# Patient Record
Sex: FEMALE | Race: BLACK OR AFRICAN AMERICAN | Employment: FULL TIME | URBAN - METROPOLITAN AREA
[De-identification: names, ages, dates, MRNs, and addresses within clinical notes are randomized per-mention and may not be internally consistent; named-entity substitution may affect disease eponyms.]

---

## 2021-07-27 ENCOUNTER — TRANSCRIBE ORDER (OUTPATIENT)
Dept: SCHEDULING | Age: 41
End: 2021-07-27

## 2021-07-27 DIAGNOSIS — Z12.31 SCREENING MAMMOGRAM FOR HIGH-RISK PATIENT: Primary | ICD-10-CM

## 2021-09-10 ENCOUNTER — HOSPITAL ENCOUNTER (OUTPATIENT)
Dept: MAMMOGRAPHY | Age: 41
Discharge: HOME | End: 2021-09-10
Payer: COMMERCIAL

## 2021-09-10 DIAGNOSIS — Z12.31 SCREENING MAMMOGRAM FOR HIGH-RISK PATIENT: ICD-10-CM

## 2021-09-10 PROCEDURE — 77063 BREAST TOMOSYNTHESIS BI: CPT

## 2022-10-17 ENCOUNTER — TRANSCRIBE ORDER (OUTPATIENT)
Dept: SCHEDULING | Age: 42
End: 2022-10-17

## 2022-10-17 DIAGNOSIS — Z12.31 SCREENING MAMMOGRAM FOR HIGH-RISK PATIENT: Primary | ICD-10-CM

## 2022-12-28 ENCOUNTER — HOSPITAL ENCOUNTER (OUTPATIENT)
Dept: MAMMOGRAPHY | Age: 42
Discharge: HOME | End: 2022-12-28
Payer: COMMERCIAL

## 2022-12-28 DIAGNOSIS — Z12.31 SCREENING MAMMOGRAM FOR HIGH-RISK PATIENT: ICD-10-CM

## 2022-12-28 PROCEDURE — 77063 BREAST TOMOSYNTHESIS BI: CPT

## 2024-02-19 ENCOUNTER — TRANSCRIBE ORDERS (OUTPATIENT)
Facility: HOSPITAL | Age: 44
End: 2024-02-19

## 2024-02-19 DIAGNOSIS — Z12.31 VISIT FOR SCREENING MAMMOGRAM: Primary | ICD-10-CM

## 2024-03-08 ENCOUNTER — HOSPITAL ENCOUNTER (OUTPATIENT)
Facility: HOSPITAL | Age: 44
Discharge: HOME | End: 2024-03-08
Payer: COMMERCIAL

## 2024-03-08 DIAGNOSIS — Z12.31 VISIT FOR SCREENING MAMMOGRAM: ICD-10-CM

## 2024-03-08 PROCEDURE — 77063 BREAST TOMOSYNTHESIS BI: CPT

## 2024-06-21 ENCOUNTER — TELEPHONE (OUTPATIENT)
Age: 44
End: 2024-06-21

## 2024-06-21 NOTE — TELEPHONE ENCOUNTER
Called patient regarding referral; no answer; left VM.       Patient called regarding SW[facility] referral. Sent seminar via email

## 2024-06-28 ENCOUNTER — TELEPHONE (OUTPATIENT)
Age: 44
End: 2024-06-28

## 2024-06-28 NOTE — TELEPHONE ENCOUNTER
Contacted pt because we have received their completed new patient paperwork and we are ready to schedule them. The patient did not answer the call so I left them a message.

## 2024-07-09 ENCOUNTER — TELEPHONE (OUTPATIENT)
Age: 44
End: 2024-07-09

## 2024-07-09 ENCOUNTER — OFFICE VISIT (OUTPATIENT)
Age: 44
End: 2024-07-09
Payer: COMMERCIAL

## 2024-07-09 VITALS
OXYGEN SATURATION: 98 % | DIASTOLIC BLOOD PRESSURE: 80 MMHG | SYSTOLIC BLOOD PRESSURE: 140 MMHG | RESPIRATION RATE: 20 BRPM | TEMPERATURE: 97.6 F | HEART RATE: 65 BPM | BODY MASS INDEX: 47.09 KG/M2 | WEIGHT: 293 LBS | HEIGHT: 66 IN

## 2024-07-09 DIAGNOSIS — E11.9 TYPE 2 DIABETES MELLITUS WITHOUT COMPLICATION, WITHOUT LONG-TERM CURRENT USE OF INSULIN (HCC): ICD-10-CM

## 2024-07-09 DIAGNOSIS — E78.00 HYPERCHOLESTEREMIA: ICD-10-CM

## 2024-07-09 DIAGNOSIS — E66.01 MORBID OBESITY (HCC): Primary | ICD-10-CM

## 2024-07-09 DIAGNOSIS — R06.83 SNORES: ICD-10-CM

## 2024-07-09 DIAGNOSIS — K21.9 GASTROESOPHAGEAL REFLUX DISEASE WITHOUT ESOPHAGITIS: ICD-10-CM

## 2024-07-09 PROCEDURE — 99204 OFFICE O/P NEW MOD 45 MIN: CPT | Performed by: SURGERY

## 2024-07-09 RX ORDER — ATORVASTATIN CALCIUM 40 MG/1
40 TABLET, FILM COATED ORAL DAILY
COMMUNITY

## 2024-07-09 RX ORDER — LORATADINE 10 MG/1
10 TABLET, ORALLY DISINTEGRATING ORAL DAILY
COMMUNITY

## 2024-07-09 RX ORDER — FERROUS SULFATE 325(65) MG
325 TABLET ORAL
COMMUNITY

## 2024-07-09 RX ORDER — SEMAGLUTIDE 1.34 MG/ML
INJECTION, SOLUTION SUBCUTANEOUS
COMMUNITY
Start: 2024-06-06

## 2024-07-09 RX ORDER — PYRIDOXINE HCL (VITAMIN B6) 100 MG
500 TABLET ORAL 2 TIMES DAILY
COMMUNITY

## 2024-07-09 ASSESSMENT — PATIENT HEALTH QUESTIONNAIRE - PHQ9
SUM OF ALL RESPONSES TO PHQ QUESTIONS 1-9: 0
SUM OF ALL RESPONSES TO PHQ9 QUESTIONS 1 & 2: 0
SUM OF ALL RESPONSES TO PHQ QUESTIONS 1-9: 0
2. FEELING DOWN, DEPRESSED OR HOPELESS: NOT AT ALL
SUM OF ALL RESPONSES TO PHQ QUESTIONS 1-9: 0
SUM OF ALL RESPONSES TO PHQ QUESTIONS 1-9: 0
1. LITTLE INTEREST OR PLEASURE IN DOING THINGS: NOT AT ALL

## 2024-07-09 NOTE — TELEPHONE ENCOUNTER
Registered patient for 12 month [facility] Pre-Bariat[facility] Counseling with at [facility] [place], [id number]    Letter mailed to patient with address on file.

## 2024-07-09 NOTE — PROGRESS NOTES
Identified patient with two patient identifiers (name and ). Reviewed chart in preparation for visit and have obtained necessary documentation.    Tequila Talley is a 43 y.o. female  Chief Complaint   Patient presents with    Bariatric, Initial Visit     New bariatric patient interested in lap gastric bypass     BP (!) 140/80 (Site: Left Lower Arm, Position: Sitting, Cuff Size: Large Adult)   Pulse 65   Temp 97.6 °F (36.4 °C)   Resp 20   Ht 1.676 m (5' 6\")   Wt (!) 181.2 kg (399 lb 8 oz)   SpO2 98%   BMI 64.48 kg/m²     1. Have you been to the ER, urgent care clinic since your last visit?  Hospitalized since your last visit?new patient    2. Have you seen or consulted any other health care providers outside of the [facility] since your last visit?  Include any pap smears or colon screening. New patient    Patient and provider made aware of elevated BP x2. Patient asymptomatic. Patient reminded to monitor BP, continue to take BP medications if prescribed, and follow up with PCP/Cardiologist.  Patient expressed understanding and agreement.

## 2024-07-10 ENCOUNTER — TELEPHONE (OUTPATIENT)
Age: 44
End: 2024-07-10

## 2024-07-10 ENCOUNTER — CLINICAL DOCUMENTATION (OUTPATIENT)
Age: 44
End: 2024-07-10

## 2024-07-12 ENCOUNTER — TELEPHONE (OUTPATIENT)
Age: 44
End: 2024-07-12

## 2024-07-15 LAB
25(OH)D3+25(OH)D2 SERPL-MCNC: 6.5 NG/ML (ref 30–100)
ALBUMIN SERPL-MCNC: 4 G/DL (ref 3.9–4.9)
ALP SERPL-CCNC: 59 IU/L (ref 44–121)
ALT SERPL-CCNC: 11 IU/L (ref 0–32)
AST SERPL-CCNC: 10 IU/L (ref 0–40)
BASOPHILS # BLD AUTO: 0 X10E3/UL (ref 0–0.2)
BASOPHILS NFR BLD AUTO: 1 %
BILIRUB SERPL-MCNC: 0.3 MG/DL (ref 0–1.2)
BUN SERPL-MCNC: 10 MG/DL (ref 6–24)
BUN/CREAT SERPL: 13 (ref 9–23)
CALCIUM SERPL-MCNC: 9.8 MG/DL (ref 8.7–10.2)
CHLORIDE SERPL-SCNC: 102 MMOL/L (ref 96–106)
CO2 SERPL-SCNC: 24 MMOL/L (ref 20–29)
CREAT SERPL-MCNC: 0.78 MG/DL (ref 0.57–1)
EGFRCR SERPLBLD CKD-EPI 2021: 97 ML/MIN/1.73
EOSINOPHIL # BLD AUTO: 0.1 X10E3/UL (ref 0–0.4)
EOSINOPHIL NFR BLD AUTO: 2 %
ERYTHROCYTE [DISTWIDTH] IN BLOOD BY AUTOMATED COUNT: 15.6 % (ref 11.7–15.4)
FOLATE SERPL-MCNC: 5.4 NG/ML
GLOBULIN SER CALC-MCNC: 3.6 G/DL (ref 1.5–4.5)
GLUCOSE SERPL-MCNC: 78 MG/DL (ref 70–99)
HBA1C MFR BLD: 5.5 % (ref 4.8–5.6)
HCT VFR BLD AUTO: 32.2 % (ref 34–46.6)
HGB BLD-MCNC: 9.7 G/DL (ref 11.1–15.9)
IMM GRANULOCYTES # BLD AUTO: 0 X10E3/UL (ref 0–0.1)
IMM GRANULOCYTES NFR BLD AUTO: 0 %
IRON SATN MFR SERPL: 11 % (ref 15–55)
IRON SERPL-MCNC: 32 UG/DL (ref 27–159)
LYMPHOCYTES # BLD AUTO: 2.2 X10E3/UL (ref 0.7–3.1)
LYMPHOCYTES NFR BLD AUTO: 40 %
MCH RBC QN AUTO: 23.9 PG (ref 26.6–33)
MCHC RBC AUTO-ENTMCNC: 30.1 G/DL (ref 31.5–35.7)
MCV RBC AUTO: 79 FL (ref 79–97)
MONOCYTES # BLD AUTO: 0.4 X10E3/UL (ref 0.1–0.9)
MONOCYTES NFR BLD AUTO: 7 %
NEUTROPHILS # BLD AUTO: 2.9 X10E3/UL (ref 1.4–7)
NEUTROPHILS NFR BLD AUTO: 50 %
PLATELET # BLD AUTO: 451 X10E3/UL (ref 150–450)
POTASSIUM SERPL-SCNC: 4.7 MMOL/L (ref 3.5–5.2)
PROT SERPL-MCNC: 7.6 G/DL (ref 6–8.5)
RBC # BLD AUTO: 4.06 X10E6/UL (ref 3.77–5.28)
SODIUM SERPL-SCNC: 138 MMOL/L (ref 134–144)
TIBC SERPL-MCNC: 291 UG/DL (ref 250–450)
TSH SERPL DL<=0.005 MIU/L-ACNC: 1.74 UIU/ML (ref 0.45–4.5)
UIBC SERPL-MCNC: 259 UG/DL (ref 131–425)
UREA BREATH TEST QL: NEGATIVE
VIT B12 SERPL-MCNC: 399 PG/ML (ref 232–1245)
WBC # BLD AUTO: 5.7 X10E3/UL (ref 3.4–10.8)

## 2024-07-19 ENCOUNTER — OFFICE VISIT (OUTPATIENT)
Age: 44
End: 2024-07-19

## 2024-07-19 DIAGNOSIS — E66.01 MORBID OBESITY (HCC): Primary | ICD-10-CM

## 2024-07-19 NOTE — PROGRESS NOTES
Pre-operative Bariatric Nutrition Evaluation - [facility] ()     Date: 2024   Physician/Surgeon:Elian Keane M.D.   Name: Tequila Talley  :  1980  Age:  43  Gender: Female   Type of Surgery: [x]           Gastric Bypass   []           Sleeve Gastrectomy    ASSESSMENT:    Past Medical History:Type II DM     Medications/Supplements:   Prior to Admission medications    Medication Sig Start Date End Date Taking? Authorizing Provider   atorvastatin (LIPITOR) 40 MG tablet Take 1 tablet by mouth daily    Yolie Malone MD   metFORMIN (GLUCOPHAGE) 500 MG tablet Take 2 tablets by mouth 2 times daily (with meals)    Yolie Malone MD   OZEMPIC, 1 MG/DOSE, 4 MG/3ML SOPN sc injection INJECT 1MG SUBCUTANEOUSLY ONCE A WEEK 24   Yolie Malone MD   loratadine (CLARITIN REDITABS) 10 MG dissolvable tablet Take 1 tablet by mouth daily    Yolie Malone MD   ferrous sulfate (IRON 325) 325 (65 Fe) MG tablet Take 1 tablet by mouth daily (with breakfast)    Yolie Malone MD   Cranberry 500 MG CAPS Take 1 capsule by mouth 2 times daily    Yloie Malone MD       Food Allergies/Intolerances:none    Anthropometrics:    Ht:66\"   Initial Office Wt: 399#    IBW: 130#    %IBW: 306#     BMI:64    Category: obesity III     Reported wt history: Pt presents today for pre-op nutrition evaluation for wt loss surgery. Reports lowest adult BW of 225# and highest adult BW of 465#. Attributes wt gain over the years r/t family h/o obesity, wt gain after having children. Has attempted wt loss through various methods with most successful wt loss of 65#. Has been unable to maintain long term or significant wt loss and is now seeking approval for weight loss surgery. Pt will need to complete 6 months of supervised weight loss for insurance requirements and a 40# wt loss recommended during that time.      Exercise/Physical Activity:walking at work; researching joining a gym next

## 2024-07-30 ASSESSMENT — SLEEP AND FATIGUE QUESTIONNAIRES
DO YOU HAVE PROBLEMS WITH FREQUENT AWAKENINGS AT NIGHT: NO
ARE YOU BOTHERED BY WAKING UP TOO EARLY AND NOT BEING ABLE TO GET BACK TO SLEEP: NO
DO YOU GET TOO LITTLE SLEEP AT NIGHT: NO
HOW LIKELY ARE YOU TO NOD OFF OR FALL ASLEEP WHILE LYING DOWN TO REST IN THE AFTERNOON WHEN CIRCUMSTANCES PERMIT: SLIGHT CHANCE OF DOZING
HOW LIKELY ARE YOU TO NOD OFF OR FALL ASLEEP IN A CAR, WHILE STOPPED FOR A FEW MINUTES IN TRAFFIC: WOULD NEVER DOZE
DO YOU GENERALLY HAVE DIFFICULTY REMEMBERING THINGS BECAUSE YOU ARE SLEEPY OR TIRED: NO
HOW LIKELY ARE YOU TO NOD OFF OR FALL ASLEEP WHILE SITTING QUIETLY AFTER LUNCH WITHOUT ALCOHOL: WOULD NEVER DOZE
HOW LIKELY ARE YOU TO NOD OFF OR FALL ASLEEP WHILE SITTING AND READING: WOULD NEVER DOZE
DO YOU HAVE DIFFICULTY WATCHING A MOVIE OR VIDEO BECAUSE YOU BECOME SLEEPY OR TIRED: NO
DO YOU HAVE DIFFICULTY VISITING YOUR FAMILY OR FRIENDS IN THEIR HOME BECAUSE YOU BECOME SLEEPY OR TIRED: NO
FOSQ SCORE: 20
DO YOU GET TOO LITTLE SLEEP AT NIGHT: NO
DO YOU TAKE NAPS: NO
SELECT ANY OF THE FOLLOWING BEHAVIORS OBSERVED WHILE PATIENT ASLEEP: LOUD SNORING
ESS TOTAL SCORE: 1
AVERAGE NUMBER OF SLEEP HOURS: 8
AVERAGE NUMBER OF SLEEP HOURS: 8
HAS YOUR RELATIONSHIP WITH FAMILY, FRIENDS OR WORK COLLEAGUES BEEN AFFECTED BECAUSE YOU ARE SLEEPY OR TIRED: NO
HOW LIKELY ARE YOU TO NOD OFF OR FALL ASLEEP WHILE SITTING INACTIVE IN A PUBLIC PLACE: WOULD NEVER DOZE
HOW LIKELY ARE YOU TO NOD OFF OR FALL ASLEEP WHILE SITTING INACTIVE IN A PUBLIC PLACE: WOULD NEVER DOZE
HOW LIKELY ARE YOU TO NOD OFF OR FALL ASLEEP WHILE SITTING AND TALKING TO SOMEONE: WOULD NEVER DOZE
HOW LIKELY ARE YOU TO NOD OFF OR FALL ASLEEP WHEN YOU ARE A PASSENGER IN A CAR FOR AN HOUR WITHOUT A BREAK: WOULD NEVER DOZE
SELECT ANY OF THE FOLLOWING BEHAVIORS OBSERVED WHILE YOU ARE ASLEEP: LOUD SNORING
HOW LIKELY ARE YOU TO NOD OFF OR FALL ASLEEP IN A CAR, WHILE STOPPED FOR A FEW MINUTES IN TRAFFIC: WOULD NEVER DOZE
NUMBER OF TIMES YOU WAKE PER NIGHT: 1
HOW LIKELY ARE YOU TO NOD OFF OR FALL ASLEEP WHILE SITTING QUIETLY AFTER LUNCH WITHOUT ALCOHOL: WOULD NEVER DOZE
DO YOU HAVE DIFFICULTY CONCENTRATING ON THE THINGS YOU DO BECAUSE YOU ARE SLEEPY OR TIRED: NO
DO YOU WORK SHIFTS: NO
ARE YOU BOTHERED BY WAKING UP TOO EARLY AND NOT BEING ABLE TO GET BACK TO SLEEP: NO
DO YOU HAVE DIFFICULTY BEING AS ACTIVE AS YOU WANT TO BE IN THE MORNING BECAUSE YOU ARE SLEEPY OR TIRED: NO
DO YOU HAVE DIFFICULTY OPERATING A MOTOR VEHICLE FOR LONG DISTANCES (GREATER THAN 100 MILES) BECAUSE YOU BECOME SLEEPY: NO
WHAT TIME DO YOU USUALLY GO TO BED: 20:00
HOW LIKELY ARE YOU TO NOD OFF OR FALL ASLEEP WHILE SITTING AND TALKING TO SOMEONE: WOULD NEVER DOZE
DO YOU HAVE DIFFICULTY OPERATING A MOTOR VEHICLE FOR SHORT DISTANCES (LESS THAN 100 MILES) BECAUSE YOU BECOME SLEEPY: NO
HOW LIKELY ARE YOU TO NOD OFF OR FALL ASLEEP WHILE WATCHING TV: WOULD NEVER DOZE
HOW LIKELY ARE YOU TO NOD OFF OR FALL ASLEEP WHEN YOU ARE A PASSENGER IN A CAR FOR AN HOUR WITHOUT A BREAK: WOULD NEVER DOZE
HOW LIKELY ARE YOU TO NOD OFF OR FALL ASLEEP WHILE WATCHING TV: WOULD NEVER DOZE
HAS YOUR MOOD BEEN AFFECTED BECAUSE YOU ARE SLEEPY OR TIRED: NO
DO YOU HAVE DIFFICULTY BEING AS ACTIVE AS YOU WANT TO BE IN THE EVENING BECAUSE YOU ARE SLEEPY OR TIRED: NO
HOW LIKELY ARE YOU TO NOD OFF OR FALL ASLEEP WHILE LYING DOWN TO REST IN THE AFTERNOON WHEN CIRCUMSTANCES PERMIT: SLIGHT CHANCE OF DOZING
HOW LIKELY ARE YOU TO NOD OFF OR FALL ASLEEP WHILE SITTING AND READING: WOULD NEVER DOZE

## 2024-07-31 ENCOUNTER — TELEPHONE (OUTPATIENT)
Age: 44
End: 2024-07-31

## 2024-08-02 ENCOUNTER — PROCEDURE VISIT (OUTPATIENT)
Age: 44
End: 2024-08-02

## 2024-08-02 ENCOUNTER — OFFICE VISIT (OUTPATIENT)
Age: 44
End: 2024-08-02
Payer: COMMERCIAL

## 2024-08-02 VITALS
DIASTOLIC BLOOD PRESSURE: 87 MMHG | OXYGEN SATURATION: 98 % | HEIGHT: 66 IN | BODY MASS INDEX: 47.09 KG/M2 | WEIGHT: 293 LBS | HEART RATE: 78 BPM | SYSTOLIC BLOOD PRESSURE: 124 MMHG

## 2024-08-02 DIAGNOSIS — G47.33 OSA (OBSTRUCTIVE SLEEP APNEA): Primary | ICD-10-CM

## 2024-08-02 PROCEDURE — 99203 OFFICE O/P NEW LOW 30 MIN: CPT | Performed by: INTERNAL MEDICINE

## 2024-08-02 NOTE — PROGRESS NOTES
[address], [place] [address]  Tel.  [phone]    Fax. [phone]     [address], [place] [address]  Tel.  [phone]    Fax. [phone] [address] [place] [address]  Tel.  [phone]    Fax. [phone]       Tequila Talley is a 44 y.o. year old female referred by Dr. Elian Valencia  for evaluation of a sleep disorder.       ASSESSMENT/PLAN:     Diagnosis Orders   1. CAIT (obstructive sleep apnea)  PAT - Home Sleep Test      2. BMI 60.0-69.9, adult (HCC)            Patient has a history and examination consistent with the diagnosis of sleep apnea.    Return for follow-up after testing is completed.    * The patient currently has a Low Risk for having sleep apnea.  STOP-BANG score 3.    * Sleep testing was ordered for initial evaluation.      Orders Placed This Encounter   Procedures    PAT - Home Sleep Test     Standing Status:   Future     Standing Expiration Date:   2/2/2025     Order Specific Question:   Location For Sleep Study     Answer:   [facility]       * She was provided information on sleep apnea including corresponding risk factors and the importance of proper treatment.     * Treatment options were reviewed in detail. she would like to proceed with PAP therapy. Patient will be seen in follow-up in 6-8 weeks after PAP setup to gauge treatment response and adherence to therapy.     * The patient was counseled regarding proper sleep hygiene, with emphasis on ensuring sufficient total sleep time; safe driving and the benefits of exercise and weight loss.      * All of her questions were addressed.    2. Recommended a dedicated weight loss program through appropriate diet and exercise regimen as significant weight reduction has been shown to reduce severity of obstructive sleep apnea.     SUBJECTIVE/OBJECTIVE:    Tequila Talley is an 44 y.o. female referred

## 2024-08-02 NOTE — PROGRESS NOTES
S>Tequila Talley is a 44 y.o. female seen today to receive a home sleep testing unit (WatchPAT).    Patient was educated on proper hookup and operation of the WatchPAT HST via detailed instruction sheet .  Instruction forms with after hours contact and documentation were signed.    O>    There were no vitals taken for this visit.      A>  No diagnosis found.      P>  General information regarding operations and maintenance of the device was provided.  Follow-up appointment was made to return the WatchPAT HST. She will be contacted once the results have been reviewed.  She was asked to contact our office for any problems regarding her home sleep test study.

## 2024-08-02 NOTE — PATIENT INSTRUCTIONS
[address]  [place] [address]  Tel.  [phone]  Fax. [phone] [address]  [place] [address]  Tel.  [phone]  Fax. [phone] [address] [place] [address]  Tel.  [phone]  Fax. [phone]     Sleep Apnea: After Your Visit  Your Care Instructions  Sleep apnea occurs when you frequently stop breathing for 10 seconds or longer during sleep. It can be mild to severe, based on the number of times per hour that you stop breathing or have slowed breathing. Blocked or narrowed airways in your nose, mouth, or throat can cause sleep apnea. Your airway can become blocked when your throat muscles and tongue relax during sleep.  Sleep apnea is common, occurring in 1 out of 20 individuals.  Individuals having any of the following characteristics should be evaluated and treated right away due to high risk and detrimental consequences from untreated sleep apnea:  Obesity  Congestive Heart failure  Atrial Fibrillation  Uncontrolled Hypertension  Type II Diabetes  Night-time Arrhythmias  Stroke  Pulmonary Hypertension  High-risk Driving Populations (pilots, truck drivers, etc.)  Patients Considering Weight-loss Surgery    How do you know you have sleep apnea?  You probably have sleep apnea if you answer 'yes' to 3 or more of the following questions:  S - Have you been told that you Snore?   T - Are you often Tired during the day?  O - Has anyone Observed you stop breathing while sleeping?  P- Do you have (or are being treated for) high blood Pressure?    B - Are you obese (Body Mass Index > 35)?  A - Is your Age 50 years old or older?  N - Is your Neck size greater than 16 inches?  G - Are you male Gender?  A sleep physician can prescribe a breathing device that prevents tissues in the throat from blocking your airway. Or your doctor may recommend using a dental device (oral breathing device) to help keep your airway open. In some cases, surgery may

## 2024-08-16 ENCOUNTER — OFFICE VISIT (OUTPATIENT)
Age: 44
End: 2024-08-16

## 2024-08-16 DIAGNOSIS — E66.01 MORBID OBESITY (HCC): Primary | ICD-10-CM

## 2024-08-16 NOTE — PROGRESS NOTES
[facility] at [facility]  Supervised Weight Loss     Date:   2024    Patient's Name: Tequila Talley  : 1980    Insurance:  [facility]             Session: 2 of  6  Surgery: Gastric Bypass   Surgeon:  Elian Keane M.D.     Height: 66\"  Reported Weight:    386      Lbs.   BMI: 62   Pounds Lost since last month: 13#               Pounds Gained since last month: 0    Starting Weight: 399#   Previous Month’s Weight: 399#  Overall Pounds Lost: 13#  Overall Pounds Gained: 0    Other Pertinent Information: Today's appointment was completed over the phone. Today's wt was self-reported. Pt recommended to lose 40# prior to surgery.     Smoking Status:  none  Alcohol Intake: none    I have reviewed with pt the guidelines of the supervised wt loss program.  Pt understands the expectations of some wt loss during the program and that wt gain could delay the process. I have also explained that appointments need to be consecutive and missing an appointment may result in starting over. Pt has received this information in writing.          Changes that patient has made since last month include:  joined a gym, increased water intake (64 oz per day), eliminated carbonated drinks, limiting/avoiding fried foods, drinking a protein shake for breakfast      Eating Habits and Behaviors  General healthy eating guidelines were also discussed. Pts were instructed that their plate should be made up 1/2 plate coming from non-starchy vegetables, 1/4 coming from lean meat, and 1/4 of their plate coming from carbohydrates, including fruits, starches, or milk.  We discussed measuring meals to 1/2 cup total per meal after surgery. Drinking only calorie-free, sugar-free and non-carbonated beverages. We discussed the importance of drinking 64 ounces of fluid per day to prevent dehydration post-operatively.                      Diet recall - [employer] protein shake at breakfast, lunch (provided at work) is

## 2024-08-27 ENCOUNTER — CLINICAL DOCUMENTATION (OUTPATIENT)
Age: 44
End: 2024-08-27

## 2024-08-27 DIAGNOSIS — G47.33 OSA (OBSTRUCTIVE SLEEP APNEA): Primary | ICD-10-CM

## 2024-08-28 ENCOUNTER — TELEPHONE (OUTPATIENT)
Age: 44
End: 2024-08-28

## 2024-08-28 NOTE — PROGRESS NOTES
Tequila Talley is to be contacted by sleep technologists regarding results of WatchPAT Testing which was indicative of an average RDI 17.0, pAHI 3% of 13.5 and pAHI 4% of 8.2 per hour.  SpO2 seun was 87% and SpO2 of < 88% was 0.2 minutes.        An APAP prescription has been written and patient will be contacted by office staff regarding follow-up  in 2-3 months after initiation of therapy.    Encounter Diagnosis   Name Primary?    CAIT (obstructive sleep apnea) Yes       Orders Placed This Encounter   Procedures    DME Order for (Specify) as OP     - DME device ordered - ResMed Device with Heated Humidifer  / .   - Diagnosis: Obstructive Sleep Apnea (G47.33)  - Length of Need: Lifetime    Pressure Settin - 15 cmH2O     Nasal Cushion (Replace) 2 per month.     Nasal Interface Mask 1 every 3 months.    Headgear 1 every 6 months.     Filter(s) Disposable 2 per month.   Filter(s) Non-Disposable 1 every 6 months.      Water Chamber for Humidifier (Replace) 1 every 6 months.   Tubing with heating element 1 every 3 months.    Perform Mask Fitting per patient preference and comfort - replace as above.      Ehsan Alvarado MD, FAASM; NPI: [id number]  Electronically signed. 2024

## 2024-08-29 ENCOUNTER — TELEPHONE (OUTPATIENT)
Age: 44
End: 2024-08-29

## 2024-08-29 NOTE — TELEPHONE ENCOUNTER
Patient called into the office regarding sleep study results. Patient requested to be contact at her work number [phone] to discuss results.

## 2024-08-29 NOTE — TELEPHONE ENCOUNTER
Pt read [facility] message with results.    Please fax DME order & Schedule 1st adherence visit in 31 to 90 days.

## 2024-09-04 NOTE — TELEPHONE ENCOUNTER
Attempted to contact the patient to discuss PAP device order and DME company options x 2. Unable to LVM or reach patient as \"call cannot be completed at this time\".

## 2024-09-06 ENCOUNTER — CLINICAL DOCUMENTATION (OUTPATIENT)
Age: 44
End: 2024-09-06

## 2024-09-06 NOTE — TELEPHONE ENCOUNTER
Attempted to contact the patient to discuss PAP device order and DME company options (2nd Attempt). Unable to reach patient. PAP device order faxed to [facility]. Unable to LVM as \"call cannot be completed at this time\". [employer] message sent informing the patient that the order has been sent. Patient provided with contact information for DME company. Patient instructed to contact [facility] to schedule first adherence appointment. Importance and purpose of first adherence appointment conveyed via [employer].

## 2024-09-06 NOTE — PROGRESS NOTES
Attempted to contact the patient to discuss PAP device order and DME company options (2nd Attempt). Unable to reach patient. PAP device order faxed to [facility]. Unable to LVM as \"call cannot be completed at this time\". [employer] message sent informing the patient that the order has been sent. Patient provided with contact information for DME company. Patient instructed to contact [facility] to schedule first adherence appointment. Importance and purpose of first adherence appointment conveyed via [employer].

## 2024-09-10 ENCOUNTER — TELEPHONE (OUTPATIENT)
Age: 44
End: 2024-09-10

## 2024-09-13 ENCOUNTER — OFFICE VISIT (OUTPATIENT)
Age: 44
End: 2024-09-13

## 2024-09-13 DIAGNOSIS — E66.01 MORBID OBESITY (HCC): Primary | ICD-10-CM

## 2024-10-02 ENCOUNTER — TELEPHONE (OUTPATIENT)
Age: 44
End: 2024-10-02

## 2024-10-02 NOTE — TELEPHONE ENCOUNTER
Attempted to call patient regarding [facility] insurance requirements, LVM to return call.     -UGI 10/16/24  -FINISH PSYCH 8/2/24  - FINISH NUTRITION (DECEMBER)

## 2024-10-03 NOTE — TELEPHONE ENCOUNTER
Patient returning call, Going for second psych eval 10/10/24.     Confirms [facility] appt    Per insurance, she states can take up until July of 2025

## 2024-10-03 NOTE — TELEPHONE ENCOUNTER
2nd Attempt to call patient regarding [facility] insurance requirements, LVM to return call.      -UGI 10/16/24  -FINISH PSYCH 8/2/24  - FINISH NUTRITION (DECEMBER)

## 2024-10-03 NOTE — TELEPHONE ENCOUNTER
Patient called back and is requesting call back to speak with MA about the insurance requirements at [phone].

## 2024-10-11 ENCOUNTER — OFFICE VISIT (OUTPATIENT)
Age: 44
End: 2024-10-11

## 2024-10-11 DIAGNOSIS — E66.01 MORBID OBESITY: Primary | ICD-10-CM

## 2024-10-11 NOTE — PROGRESS NOTES
post-operatively.                       Patient's current diet habits include: Pt is eating 3 meals per day. Using a protein shake with a fruit for breakfast. Reports improved hunger control now that she is having breakfast more consistently. Lunch is provided at work most days (salad and protein). Pt is eating refined carbohydrate foods (bread, pasta, rice, potatoes) in moderation. Pt is using healthy cooking methods. Pt is eating meals prepared outside of the home daily (lunch provided at work). Pt is drinking 64 oz water, 1 cup coffee, no carbonated drinks. Has been practicing sipping/not gulping and 30/30 drinking rule. Pt reports no to emotional eating.         Physical Activity/Exercise  An exercise presentation was provided including information about exercise programs available both before and after surgery. We talked about the importance of increasing daily physical activity and beginning to develop an exercise regimen/routine. We talked about exercise as being an important part of long term weight loss after surgery.     Comments:  During class, I discussed with patient the importance of getting into an exercise routine.  Pt is currently going to the gym 3 times per week (occasionally 4 times per week) for activity. Pt has been encouraged to maintain and increase as tolerated.     Behavior Modification       We talked about how to eat more mindfully. Tips and recommendations for how to make these changes were provided. Pt was encouraged to keep a food journal and record what they were taking in daily.         Overall Assessment: Pt demonstrates maintenance of previously made lifestyle changes. Will continue to assess.     Patient-Set Goals:   1. Nutrition - maintain current eating habits   2. Exercise - maintain current exercise   3. Behavior -review vitamin recommendations, discuss vitamin/iron content (45 mg vs 60 mg) after mid-way visit with HAILEE Bell RD  10/11/2024

## 2024-10-16 ENCOUNTER — HOSPITAL ENCOUNTER (OUTPATIENT)
Facility: HOSPITAL | Age: 44
Discharge: HOME | End: 2024-10-19
Payer: COMMERCIAL

## 2024-10-16 DIAGNOSIS — K21.9 GASTROESOPHAGEAL REFLUX DISEASE WITHOUT ESOPHAGITIS: ICD-10-CM

## 2024-10-16 PROCEDURE — 2500000003 HC RX 250 WO HCPCS: Performed by: SURGERY

## 2024-10-16 PROCEDURE — 74240 X-RAY XM UPR GI TRC 1CNTRST: CPT

## 2024-10-16 PROCEDURE — 6370000000 HC RX 637 (ALT 250 FOR IP): Performed by: SURGERY

## 2024-10-16 RX ADMIN — BARIUM SULFATE 200 ML: 0.6 SUSPENSION ORAL at 10:01

## 2024-10-16 RX ADMIN — ANTACID/ANTIFLATULENT 1 EACH: 380; 550; 10; 10 GRANULE, EFFERVESCENT ORAL at 10:01

## 2024-10-16 RX ADMIN — BARIUM SULFATE 140 ML: 980 POWDER, FOR SUSPENSION ORAL at 09:59

## 2024-10-16 RX ADMIN — BARIUM SULFATE 1 TABLET: 700 TABLET ORAL at 10:00

## 2024-10-25 ENCOUNTER — TELEPHONE (OUTPATIENT)
Age: 44
End: 2024-10-25

## 2024-10-25 NOTE — TELEPHONE ENCOUNTER
Attempted to call pt regarding remaining insurance requirements prior to midway appt:  -Completed psych evaluation    LVM for pt to call our office at their earliest convenience.

## 2024-10-25 NOTE — TELEPHONE ENCOUNTER
Patient returned call and verified she did complete psych eval on 10/16/24, and that the provider stated the report should be ready in 3-4 weeks.     Advised pt a message would be sent to MA who contacted her, and the team would follow up if necessary

## 2024-11-05 ENCOUNTER — OFFICE VISIT (OUTPATIENT)
Age: 44
End: 2024-11-05

## 2024-11-05 DIAGNOSIS — E66.01 MORBID OBESITY: Primary | ICD-10-CM

## 2024-11-05 NOTE — PROGRESS NOTES
[facility] at [facility]  Supervised Weight Loss     Date:   2024    Patient's Name: Tequila Talley  : 1980    Tequila Talley was evaluated through a synchronous (real-time) audio encounter. Patient identification was verified at the start of the visit.   The patient was located at Home: [address] [place] [id number].  The provider was located at Home (Appt Dept State): [place].  Confirm you are appropriately licensed, registered, or certified to deliver care in the state where the patient is located as indicated above. If you are not or unsure, please re-schedule the visit: Yes, I confirm.       Insurance:  [facility]                                          Session:   Surgery: Gastric Bypass                              Surgeon:  Elian Keane M.D.      Height: 66\"                 Reported Weight:    367     Lbs.                              BMI: 59             Pounds Lost since last month: 15#               Pounds Gained since last month: 0#     Starting Weight: 399#                       Previous Month’s Weight: 382#  Overall Pounds Lost: 32#                Overall Pounds Gained: 0     Other Pertinent Information: Today's appointment was completed over the phone. Today's wt was self-reported. Pt recommended to lose 40# prior to surgery. Currently takes 65 mg OTC iron per day (at bedtime) recommended by PCP. Pt anticipates completion of 12 months [facility] health coaching in 2025.     Smoking Status:  none  Alcohol Intake: none    I have reviewed with pt the guidelines of the supervised wt loss program.  Pt understands the expectations of some wt loss during the program and that wt gain could delay the process. I have also explained that appointments need to be consecutive and missing an appointment may result in starting over. Pt has received this information in writing. This appointment was complimentary/non-billable as part of the bariatric surgery

## 2024-11-12 ENCOUNTER — OFFICE VISIT (OUTPATIENT)
Age: 44
End: 2024-11-12
Payer: COMMERCIAL

## 2024-11-12 ENCOUNTER — TELEPHONE (OUTPATIENT)
Age: 44
End: 2024-11-12

## 2024-11-12 VITALS
BODY MASS INDEX: 47.09 KG/M2 | WEIGHT: 293 LBS | OXYGEN SATURATION: 89 % | DIASTOLIC BLOOD PRESSURE: 79 MMHG | HEART RATE: 79 BPM | TEMPERATURE: 97.4 F | RESPIRATION RATE: 14 BRPM | SYSTOLIC BLOOD PRESSURE: 108 MMHG | HEIGHT: 66 IN

## 2024-11-12 DIAGNOSIS — E66.01 MORBID OBESITY: Primary | ICD-10-CM

## 2024-11-12 DIAGNOSIS — E78.00 HYPERCHOLESTEREMIA: ICD-10-CM

## 2024-11-12 DIAGNOSIS — K21.9 GASTROESOPHAGEAL REFLUX DISEASE WITHOUT ESOPHAGITIS: ICD-10-CM

## 2024-11-12 DIAGNOSIS — G47.33 OBSTRUCTIVE SLEEP APNEA SYNDROME: ICD-10-CM

## 2024-11-12 DIAGNOSIS — E11.9 TYPE 2 DIABETES MELLITUS WITHOUT COMPLICATION, WITHOUT LONG-TERM CURRENT USE OF INSULIN (HCC): ICD-10-CM

## 2024-11-12 PROCEDURE — 99213 OFFICE O/P EST LOW 20 MIN: CPT | Performed by: NURSE PRACTITIONER

## 2024-11-12 PROCEDURE — 3044F HG A1C LEVEL LT 7.0%: CPT | Performed by: NURSE PRACTITIONER

## 2024-11-12 RX ORDER — NORGESTIMATE AND ETHINYL ESTRADIOL 0.25-0.035
1 KIT ORAL DAILY
COMMUNITY
Start: 2024-10-14 | End: 2025-10-14

## 2024-11-12 RX ORDER — ERGOCALCIFEROL 1.25 MG/1
50000 CAPSULE, LIQUID FILLED ORAL WEEKLY
Qty: 12 CAPSULE | Refills: 1 | Status: SHIPPED | OUTPATIENT
Start: 2024-11-12

## 2024-11-12 ASSESSMENT — PATIENT HEALTH QUESTIONNAIRE - PHQ9
SUM OF ALL RESPONSES TO PHQ QUESTIONS 1-9: 0
SUM OF ALL RESPONSES TO PHQ QUESTIONS 1-9: 0
2. FEELING DOWN, DEPRESSED OR HOPELESS: NOT AT ALL
SUM OF ALL RESPONSES TO PHQ QUESTIONS 1-9: 0
SUM OF ALL RESPONSES TO PHQ QUESTIONS 1-9: 0
SUM OF ALL RESPONSES TO PHQ9 QUESTIONS 1 & 2: 0
1. LITTLE INTEREST OR PLEASURE IN DOING THINGS: NOT AT ALL

## 2024-11-12 ASSESSMENT — ENCOUNTER SYMPTOMS
SHORTNESS OF BREATH: 0
SINUS PAIN: 0
NAUSEA: 0
ABDOMINAL PAIN: 0
SINUS PRESSURE: 0
VOMITING: 0

## 2024-11-12 NOTE — TELEPHONE ENCOUNTER
Identified patient with two patient identifiers (name and ). Reviewed chart in preparation for encounter and have obtained necessary documentation.    Called and spoke with patient regarding [facility] insurance requirements. Patient is aware we still need psych eval, states 4-6 weeks from her last appt 10/16/24, she will have this faxed over. Patient will finish [facility] in July. Patient understood everything discussed and thankful for the call.

## 2024-11-12 NOTE — PROGRESS NOTES
Chief Complaint   Patient presents with    Weight Management     [facility]       Tequila Talley 1980 presents today for presurgical bariatric evaluation in preparation for:     Procedure Gastric bypass  Surgeon Dr. Elian Keane    Last Weight Metrics:      11/12/2024     9:30 AM 8/2/2024     8:49 AM 7/9/2024    11:17 AM   Weight Loss Metrics   Height 5' 6\" 5' 6\" 5' 6\"   Weight - Scale 368 lbs 6 oz 395 lbs 399 lbs 8 oz   BMI (Calculated) 59.6 kg/m2 63.9 kg/m2 64.6 kg/m2       [x] Bariatric comorbidities present: non-insulin dependent diabetes, GERD, high cholesterol and obstructive sleep apnea    [x] Ambulatory status: independent    [x] The patient's reported level of exercise: moderately active.  Exercise: encouraged to perform at least 30 mins of at least moderate-intensity physical activity on 5 or more days a week. The activity can be undertaken in one session or several lasting 10 mins or more. Use of a wearable fitness device may help meet activity goals and was recommended. She is walking at work. Going to the gym.     [x] Nutrient screening with iron studies, B12, folic acid, and 25-vitamin D   Iron  32  B12/Folate   399    D   6.5  [] Sleep apnea screening  [] Sleep Medicine referral         [x] GI evaluation     Upper GI results IMPRESSION:  1.  Mild gastroesophageal reflux.  2.  Unremarkable fluoroscopic examination of the stomach.    Upper endoscopy results na     H. Pylori results  negative    [] Endocrine evaluation (HgA1C <8% prior to surgery)    [facility] na     HgA1C  5.5    Date   7/9/2024    TSH results    TSH (uIU/mL)   Date Value   07/09/2024 1.740           [x] Psychological Evaluation- [facility] hayder chacon. Lyla Bae.     [x] Nutrition Evaluation/Visits 5 of 6    [] Cardiac Evaluation     [] Pulmonary Evaluation     [x] Pregnancy counseling: it is recommended to delay pregnancy 12-18 months after bariatric surgery due to rapid and significant weight loss

## 2024-11-12 NOTE — PROGRESS NOTES
Identified patient with two patient identifiers (name and ). Reviewed chart in preparation for visit and have obtained necessary documentation.    Tequila Talley is a 44 y.o. female  Chief Complaint   Patient presents with    Weight Management     [facility]     /79 (Site: Left Lower Arm, Position: Sitting, Cuff Size: Large Adult)   Pulse 79   Temp 97.4 °F (36.3 °C) (Oral)   Resp 14   Ht 1.676 m (5' 6\")   Wt (!) 167.1 kg (368 lb 6.4 oz)   SpO2 (!) 89%   BMI 59.46 kg/m²     1. Have you been to the ER, urgent care clinic since your last visit?  Hospitalized since your last visit?no    2. Have you seen or consulted any other health care providers outside of the [facility] since your last visit?  Include any pap smears or colon screening. yes - pcp

## 2024-12-03 ENCOUNTER — OFFICE VISIT (OUTPATIENT)
Age: 44
End: 2024-12-03

## 2024-12-03 DIAGNOSIS — E66.01 MORBID OBESITY: Primary | ICD-10-CM

## 2024-12-03 NOTE — PROGRESS NOTES
[facility] at [facility]  Supervised Weight Loss     Date:   12/3/2024    Patient's Name: Tequila Talley  : 1980    Tequila Talley was evaluated through a synchronous (real-time) audio encounter. Patient identification was verified at the start of the visit.   The patient was located at Other: work .  The provider was located at Home (Appt Dept [place].  Confirm you are appropriately licensed, registered, or certified to deliver care in the state where the patient is located as indicated above. If you are not or unsure, please re-schedule the visit: Yes, I confirm.     Insurance:  [employer]                                          Session:   Surgery: Gastric Bypass                              Surgeon:  Elian Keane M.D.      Height: 66\"                 Reported Weight:    364     Lbs.                              BMI: 58             Pounds Lost since last month: 3#               Pounds Gained since last month: 0#     Starting Weight: 399#                       Previous Month’s Weight: 367#  Overall Pounds Lost: 35#                Overall Pounds Gained: 0     Other Pertinent Information: Today's appointment was completed over the phone. Today's wt was self-reported. Pt recommended to lose 40# prior to surgery. Currently takes 65 mg OTC iron per day (at bedtime) recommended by PCP. Pt anticipates completion of 12 months [facility] coaching in 2025.     Smoking Status:  none  Alcohol Intake: none    I have reviewed with pt the guidelines of the supervised wt loss program.  Pt understands the expectations of some wt loss during the program and that wt gain could delay the process. I have also explained that classes need to be consecutive.  Missing a class may result in starting over. Pt has received this information in writing. This appointment was complimentary/non-billable as part of the bariatric surgery program.         Changes that patient has made since last

## 2024-12-04 ENCOUNTER — TELEPHONE (OUTPATIENT)
Age: 44
End: 2024-12-04

## 2024-12-04 NOTE — TELEPHONE ENCOUNTER
Attempted to call patient regarding psych eval. LVM to return call.      According to chart I am not seeing patients psych eval. This will need faxed over.

## 2024-12-05 NOTE — TELEPHONE ENCOUNTER
Patient called to explain provider is working on psych eval; once completed provider will fax documentation

## 2024-12-06 ENCOUNTER — TELEPHONE (OUTPATIENT)
Age: 44
End: 2024-12-06

## 2024-12-06 NOTE — TELEPHONE ENCOUNTER
Patient called office to inquire about an appointment reschedule. Informed patient that the next available appointment would be in 04/2025. Patient stated she will keep her scheduled appointment for 12/19/2024.

## 2024-12-16 ASSESSMENT — SLEEP AND FATIGUE QUESTIONNAIRES
HOW LIKELY ARE YOU TO NOD OFF OR FALL ASLEEP WHEN YOU ARE A PASSENGER IN A CAR FOR AN HOUR WITHOUT A BREAK: WOULD NEVER DOZE
HOW LIKELY ARE YOU TO NOD OFF OR FALL ASLEEP WHILE SITTING AND TALKING TO SOMEONE: WOULD NEVER DOZE
HOW LIKELY ARE YOU TO NOD OFF OR FALL ASLEEP WHILE SITTING AND READING: WOULD NEVER DOZE
DO YOU HAVE DIFFICULTY BEING AS ACTIVE AS YOU WANT TO BE IN THE EVENING BECAUSE YOU ARE SLEEPY OR TIRED: NO
HOW LIKELY ARE YOU TO NOD OFF OR FALL ASLEEP WHILE LYING DOWN TO REST IN THE AFTERNOON WHEN CIRCUMSTANCES PERMIT: WOULD NEVER DOZE
HAS YOUR MOOD BEEN AFFECTED BECAUSE YOU ARE SLEEPY OR TIRED: NO
HOW LIKELY ARE YOU TO NOD OFF OR FALL ASLEEP IN A CAR, WHILE STOPPED FOR A FEW MINUTES IN TRAFFIC: WOULD NEVER DOZE
HOW LIKELY ARE YOU TO NOD OFF OR FALL ASLEEP WHILE WATCHING TV: WOULD NEVER DOZE
FOSQ SCORE: 20
HOW LIKELY ARE YOU TO NOD OFF OR FALL ASLEEP WHILE SITTING QUIETLY AFTER LUNCH WITHOUT ALCOHOL: WOULD NEVER DOZE
DO YOU HAVE DIFFICULTY OPERATING A MOTOR VEHICLE FOR LONG DISTANCES (GREATER THAN 100 MILES) BECAUSE YOU BECOME SLEEPY: NO
DO YOU GENERALLY HAVE DIFFICULTY REMEMBERING THINGS BECAUSE YOU ARE SLEEPY OR TIRED: NO
HOW LIKELY ARE YOU TO NOD OFF OR FALL ASLEEP WHEN YOU ARE A PASSENGER IN A CAR FOR AN HOUR WITHOUT A BREAK: WOULD NEVER DOZE
ESS TOTAL SCORE: 0
HOW LIKELY ARE YOU TO NOD OFF OR FALL ASLEEP WHILE WATCHING TV: WOULD NEVER DOZE
HOW LIKELY ARE YOU TO NOD OFF OR FALL ASLEEP WHILE SITTING AND READING: WOULD NEVER DOZE
HOW LIKELY ARE YOU TO NOD OFF OR FALL ASLEEP WHILE LYING DOWN TO REST IN THE AFTERNOON WHEN CIRCUMSTANCES PERMIT: WOULD NEVER DOZE
HAS YOUR RELATIONSHIP WITH FAMILY, FRIENDS OR WORK COLLEAGUES BEEN AFFECTED BECAUSE YOU ARE SLEEPY OR TIRED: NO
HOW LIKELY ARE YOU TO NOD OFF OR FALL ASLEEP WHILE SITTING INACTIVE IN A PUBLIC PLACE: WOULD NEVER DOZE
DO YOU HAVE DIFFICULTY VISITING YOUR FAMILY OR FRIENDS IN THEIR HOME BECAUSE YOU BECOME SLEEPY OR TIRED: NO
DO YOU HAVE DIFFICULTY CONCENTRATING ON THE THINGS YOU DO BECAUSE YOU ARE SLEEPY OR TIRED: NO
HOW LIKELY ARE YOU TO NOD OFF OR FALL ASLEEP WHILE SITTING QUIETLY AFTER LUNCH WITHOUT ALCOHOL: WOULD NEVER DOZE
DO YOU HAVE DIFFICULTY BEING AS ACTIVE AS YOU WANT TO BE IN THE MORNING BECAUSE YOU ARE SLEEPY OR TIRED: NO
HOW LIKELY ARE YOU TO NOD OFF OR FALL ASLEEP IN A CAR, WHILE STOPPED FOR A FEW MINUTES IN TRAFFIC: WOULD NEVER DOZE
DO YOU HAVE DIFFICULTY OPERATING A MOTOR VEHICLE FOR SHORT DISTANCES (LESS THAN 100 MILES) BECAUSE YOU BECOME SLEEPY: NO
DO YOU HAVE DIFFICULTY WATCHING A MOVIE OR VIDEO BECAUSE YOU BECOME SLEEPY OR TIRED: NO
HOW LIKELY ARE YOU TO NOD OFF OR FALL ASLEEP WHILE SITTING INACTIVE IN A PUBLIC PLACE: WOULD NEVER DOZE
HOW LIKELY ARE YOU TO NOD OFF OR FALL ASLEEP WHILE SITTING AND TALKING TO SOMEONE: WOULD NEVER DOZE

## 2024-12-19 ENCOUNTER — CLINICAL DOCUMENTATION (OUTPATIENT)
Age: 44
End: 2024-12-19

## 2024-12-19 ENCOUNTER — OFFICE VISIT (OUTPATIENT)
Age: 44
End: 2024-12-19
Payer: COMMERCIAL

## 2024-12-19 VITALS
DIASTOLIC BLOOD PRESSURE: 89 MMHG | BODY MASS INDEX: 47.09 KG/M2 | TEMPERATURE: 98.3 F | WEIGHT: 293 LBS | SYSTOLIC BLOOD PRESSURE: 167 MMHG | HEIGHT: 66 IN | OXYGEN SATURATION: 99 % | HEART RATE: 80 BPM

## 2024-12-19 DIAGNOSIS — G47.33 OSA (OBSTRUCTIVE SLEEP APNEA): Primary | ICD-10-CM

## 2024-12-19 PROCEDURE — 99212 OFFICE O/P EST SF 10 MIN: CPT | Performed by: INTERNAL MEDICINE

## 2024-12-19 RX ORDER — SEMAGLUTIDE 2.68 MG/ML
INJECTION, SOLUTION SUBCUTANEOUS
COMMUNITY
Start: 2024-11-21

## 2024-12-19 RX ORDER — NAPROXEN 500 MG/1
TABLET ORAL
COMMUNITY
Start: 2024-09-18

## 2024-12-19 NOTE — PROGRESS NOTES
[address], [place] [address]  Tel.  [phone]    Fax. [phone]     [address] [address]  Tel.  [phone]    Fax. [phone] [address] [place] [address]  Tel.  [phone]    Fax. [phone]       Tequila Talley (: 1980) is a 44 y.o. female, established patient, seen for positive airway pressure follow-up and evaluation of the following chief complaint(s):   Sleep Problem (1st adherence)       Tequila Talley was last seen by me on 24, prior notes reviewed in detail.  WatchPAT Testing performed on 24 was indicative of an average RDI 17.0, pAHI 3% of 13.5 and pAHI 4% of 8.2 per hour.  SpO2 seun was 87% and SpO2 of < 88% was 0.2 minutes.      ASSESSMENT/PLAN:   Diagnosis Orders   1. CAIT (obstructive sleep apnea)        2. BMI 50.0-59.9, adult            On ResMed:  AirSense 11 AutoSet    Settings:  Min EPAP:  06 cmH2O  Max EPAP: 15 cmH2O    EPR: 3    Sleep Apnea -   * She is adherent with PAP therapy and PAP continues to benefit patient and remains necessary for control of her sleep apnea. Continue on current pressures    * She is familiar with the telephone monitoring application, is willing to track therapy and agrees to notify use if AHI is >5 per hour.    * Counseling was provided regarding the importance of regular PAP use with emphasis on ensuring sufficient total sleep time, proper sleep hygiene, and safe driving.    * Re-enforced proper and regular cleaning for the device.    * She was asked to contact our office for any problems regarding PAP therapy.      2. Recommended a dedicated weight loss program through appropriate diet and exercise regimen as significant weight reduction has been shown to reduce severity of obstructive sleep apnea.     Return for follow-up in 1 year or as needed.       SUBJECTIVE/OBJECTIVE:    She  is seen today for follow up on PAP device and reports no problems using

## 2024-12-19 NOTE — PATIENT INSTRUCTIONS
[address]  [place] [address]  Tel.  [phone]  Fax. [phone] [address]  [place] [address]  Tel.  [phone]  Fax. [phone] [address] [place] [address]  Tel.  [phone]  Fax. [phone]     Learning About CPAP for Sleep Apnea  What is CPAP?              CPAP is a small machine that you use at home every night while you sleep. It increases air pressure in your throat to keep your airway open. When you have sleep apnea, this can help you sleep better so you feel much better. CPAP stands for \"continuous positive airway pressure.\"  The CPAP machine will have one of the following:  A mask that covers your nose and mouth  Prongs that fit into your nose  A mask that covers your nose only, the most common type. This type is called NCPAP. The N stands for \"nasal.\"  Why is it done?  CPAP is usually the best treatment for obstructive sleep apnea. It is the first treatment choice and the most widely used. Your doctor may suggest CPAP if you have:  Moderate to severe sleep apnea.  Sleep apnea and coronary artery disease (CAD) or heart failure.  How does it help?  CPAP can help you have more normal sleep, so you feel less sleepy and more alert during the daytime.  CPAP may help keep heart failure or other heart problems from getting worse.  NCPAP may help lower your blood pressure.  If you use CPAP, your bed partner may also sleep better because you are not snoring or restless.  What are the side effects?  Some people who use CPAP have:  A dry or stuffy nose and a sore throat.  Irritated skin on the face.  Sore eyes.  Bloating.  If you have any of these problems, work with your doctor to fix them. Here are some things you can try:  Be sure the mask or nasal prongs fit well.  See if your doctor can adjust the pressure of your CPAP.  If your nose is dry, try a humidifier.  If your nose is runny or stuffy, try decongestant medicine or a steroid

## 2025-03-03 ENCOUNTER — OFFICE VISIT (OUTPATIENT)
Age: 45
End: 2025-03-03

## 2025-03-03 DIAGNOSIS — E66.01 MORBID OBESITY: Primary | ICD-10-CM

## 2025-03-03 NOTE — PROGRESS NOTES
and continued wt loss. Demonstrates understanding of nutrition guidelines and appears to be an appropriate candidate at this time. Anticipates completion of [employer] coaching in July 2025.     Patient-Set Goals:   1. Nutrition - maintain healthy eating habits and lifestyle changes  2. Exercise - maintain current exercise regimen  3. Behavior -continue to practice mindful eating behaviors     Marissa Bell RD  3/3/2025

## 2025-03-04 ENCOUNTER — TELEPHONE (OUTPATIENT)
Age: 45
End: 2025-03-04

## 2025-03-04 NOTE — TELEPHONE ENCOUNTER
Attempted to reach patient in regards to current [facility] insurance requirements.     Left VM informing pt that we do not have psych eval provider notes and to please have those sent to us.     Also send [employer] message in regards to missing psych eval

## 2025-03-14 ENCOUNTER — HOSPITAL ENCOUNTER (OUTPATIENT)
Facility: HOSPITAL | Age: 45
Discharge: HOME | End: 2025-03-17
Payer: COMMERCIAL

## 2025-03-14 DIAGNOSIS — Z12.31 VISIT FOR SCREENING MAMMOGRAM: ICD-10-CM

## 2025-03-14 PROCEDURE — 77063 BREAST TOMOSYNTHESIS BI: CPT

## 2025-04-30 RX ORDER — ERGOCALCIFEROL 1.25 MG/1
50000 CAPSULE, LIQUID FILLED ORAL WEEKLY
Qty: 12 CAPSULE | Refills: 0 | OUTPATIENT
Start: 2025-04-30

## 2025-05-21 ENCOUNTER — TELEPHONE (OUTPATIENT)
Age: 45
End: 2025-05-21

## 2025-05-21 NOTE — TELEPHONE ENCOUNTER
Returned patient call to update on requirements. Patient states she is set to complete [employer] in June. Advised patient to send us documentation as soon as it is received so we may call for Final Review. Scheduled a refresher dietician visit for Friday June 6.     All other requirements met. Patient thankful for the call.

## 2025-06-06 ENCOUNTER — OFFICE VISIT (OUTPATIENT)
Age: 45
End: 2025-06-06

## 2025-06-06 DIAGNOSIS — E66.01 MORBID OBESITY (HCC): Primary | ICD-10-CM

## 2025-06-06 NOTE — PROGRESS NOTES
[facility] at [facility]  Supervised Weight Loss     Date:   2025    Patient's Name: Tequila Talley  : 1980        Tequila Talley was evaluated through a synchronous (real-time) audio encounter. Patient identification was verified at the start of the visit.   The patient was located at Other: work .  The provider was located at Home (Appt Dept State): [place].  Confirm you are appropriately licensed, registered, or certified to deliver care in the state where the patient is located as indicated above. If you are not or unsure, please re-schedule the visit: Yes, I confirm.       Insurance:  [employer]ITA                                          Session:   Surgery: Gastric Bypass                              Surgeon:  Elian Keane M.D.      Height: 66\"                 Reported Weight:    344     Lbs.                              BMI: 55             Pounds Lost since last month: 10#               Pounds Gained since last month: 0#     Starting Weight: 399#                       Previous Month’s Weight: 354#  Overall Pounds Lost: 55#                Overall Pounds Gained: 0     Other Pertinent Information: Today's appointment was completed over the phone. Today's wt was self-reported. Pt recommended to lose 40# prior to surgery. Currently takes 65 mg OTC iron per day (at bedtime) recommended by PCP. 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     Smoking Status:  none  Alcohol Intake: none    I have reviewed with pt the guidelines of the supervised wt loss program.  Pt understands the expectations of some wt loss during the program and that wt gain could delay the process. I have also explained that classes need to be consecutive.  Missing a class may result in starting over. Pt has received this information in writing. This appointment was complimentary/non-billable as part of the bariatric surgery program.         Changes that patient has made since

## 2025-06-18 ENCOUNTER — TELEPHONE (OUTPATIENT)
Age: 45
End: 2025-06-18

## 2025-06-18 NOTE — TELEPHONE ENCOUNTER
Attempted to reach patient to schedule final review. Left VM for patient to call back and schedule     Schedule with Diya,   \"Final Review\"   Established patient  20 min slot

## 2025-07-01 ENCOUNTER — OFFICE VISIT (OUTPATIENT)
Age: 45
End: 2025-07-01
Payer: COMMERCIAL

## 2025-07-01 ENCOUNTER — CLINICAL DOCUMENTATION (OUTPATIENT)
Age: 45
End: 2025-07-01

## 2025-07-01 VITALS
TEMPERATURE: 97.6 F | SYSTOLIC BLOOD PRESSURE: 134 MMHG | HEART RATE: 116 BPM | WEIGHT: 293 LBS | HEIGHT: 66 IN | DIASTOLIC BLOOD PRESSURE: 86 MMHG | RESPIRATION RATE: 16 BRPM | BODY MASS INDEX: 47.09 KG/M2 | OXYGEN SATURATION: 99 %

## 2025-07-01 DIAGNOSIS — E66.01 MORBID OBESITY (HCC): Primary | ICD-10-CM

## 2025-07-01 DIAGNOSIS — E11.9 TYPE 2 DIABETES MELLITUS WITHOUT COMPLICATION, WITHOUT LONG-TERM CURRENT USE OF INSULIN (HCC): ICD-10-CM

## 2025-07-01 DIAGNOSIS — K21.9 GASTROESOPHAGEAL REFLUX DISEASE WITHOUT ESOPHAGITIS: ICD-10-CM

## 2025-07-01 DIAGNOSIS — E78.00 HYPERCHOLESTEREMIA: ICD-10-CM

## 2025-07-01 PROCEDURE — 99213 OFFICE O/P EST LOW 20 MIN: CPT | Performed by: SURGERY

## 2025-07-01 ASSESSMENT — PATIENT HEALTH QUESTIONNAIRE - PHQ9
2. FEELING DOWN, DEPRESSED OR HOPELESS: NOT AT ALL
SUM OF ALL RESPONSES TO PHQ QUESTIONS 1-9: 0
1. LITTLE INTEREST OR PLEASURE IN DOING THINGS: NOT AT ALL
SUM OF ALL RESPONSES TO PHQ QUESTIONS 1-9: 0

## 2025-07-01 NOTE — PROGRESS NOTES
Bariatric Surgery History and Physical    Subjective:     The patient is a 44 y.o. obese female seeking approval for gastric bypass.  Body mass index is 55.62 kg/m².   Tequila Talley has tried multiple diets in her  lifetime most recently trying unsupervised diets during which she was able to lose small amounts of weight (55 pounds thus far).  She is avoiding liquid calories, fried/fast foods, and exercising on a regular basis.    Bariatric comorbidities present are   Past Medical History:   Diagnosis Date    Anemia     Diabetes mellitus (HCC)     GERD (gastroesophageal reflux disease)     High cholesterol     Obesity     Type 2 diabetes mellitus without complication (HCC)        Patient Active Problem List    Diagnosis Date Noted    Gastroesophageal reflux disease without esophagitis 07/09/2024    Morbid obesity ([facility]) 07/09/2024    Hypercholesteremia 07/09/2024    Type 2 diabetes mellitus without complication, without long-term current use of insulin ([facility]) 07/09/2024    Snores 07/09/2024     Past Medical History:   Diagnosis Date    Anemia     Diabetes mellitus (HCC)     GERD (gastroesophageal reflux disease)     High cholesterol     Obesity     Type 2 diabetes mellitus without complication ([facility])       Past Surgical History:   Procedure Laterality Date    CHOLECYSTECTOMY, LAPAROSCOPIC  2003      Social History     Tobacco Use    Smoking status: Never    Smokeless tobacco: Never   Substance Use Topics    Alcohol use: Never      Family History   Problem Relation Age of Onset    Diabetes Mother     Anemia Sister       Prior to Admission medications    Medication Sig Start Date End Date Taking? Authorizing Provider   OZEMPIC, 2 MG/DOSE, 8 MG/3ML SOPN sc injection INJECT 2 MG UNDER THE SKIN ONCE A WEEK 11/21/24  Yes ProviderYolie MD   Docusate Sodium (STOOL SOFTENER LAXATIVE PO) Take by mouth   Yes ProviderYolie MD   norgestimate-ethinyl estradiol (ORTHO-CYCLEN) 0.25-35 MG-MCG per tablet Take 1 tablet

## 2025-07-01 NOTE — PROGRESS NOTES
Submitted Authorization to [facility] via [employer] for ROBOTIC ASSISTED LAPAROSCOPIC GASTRTIC BYPASS request submitted for OUTPATIENT OBS (CPT 22979) with Dr. LEAL      PENDING AUTH# [id number]

## 2025-07-01 NOTE — PROGRESS NOTES
Identified patient with two patient identifiers (name and ). Reviewed chart in preparation for visit and have obtained necessary documentation.    Tequila Talley is a 44 y.o. female  Chief Complaint   Patient presents with    Weight Management     Final review     /86 (BP Site: Right Upper Arm, Patient Position: Sitting, BP Cuff Size: Large Adult)   Pulse (!) 116   Temp 97.6 °F (36.4 °C) (Oral)   Resp 16   Ht 1.676 m (5' 6\")   Wt (!) 156.3 kg (344 lb 9.6 oz)   SpO2 99%   BMI 55.62 kg/m²     1. Have you been to the ER, urgent care clinic since your last visit?  Hospitalized since your last visit?no    2. Have you seen or consulted any other health care providers outside of [facility] since your last visit?  Include any pap smears or colon screening. no

## 2025-07-02 ENCOUNTER — TELEPHONE (OUTPATIENT)
Age: 45
End: 2025-07-02

## 2025-07-02 ENCOUNTER — PREP FOR PROCEDURE (OUTPATIENT)
Age: 45
End: 2025-07-02

## 2025-07-02 DIAGNOSIS — E78.00 PURE HYPERCHOLESTEROLEMIA: ICD-10-CM

## 2025-07-02 PROBLEM — E11.9 DIABETES MELLITUS (HCC): Status: ACTIVE | Noted: 2025-07-02

## 2025-07-02 PROBLEM — K21.9 ESOPHAGEAL REFLUX: Status: ACTIVE | Noted: 2025-07-02

## 2025-07-02 NOTE — TELEPHONE ENCOUNTER
LM for patient to call me to schedule bariatric surgery with Dr Keane. I left my direct phone number for patient to call me back.

## 2025-07-02 NOTE — TELEPHONE ENCOUNTER
LM for patient to let her know her surgery letter has posted to her my chart and will go out in the mail. I reviewed the virtual pre op class that that won't show up in her my chart.  I informed her to be on the look out for an e-mail from [facility] in your junk/spam folder.  I explained what will be in the e-mail and to please reply to it so we know you received it.   I asked her to review her appointments and reach out to me as soon as she can if there is any scheduling conflicts, that way we can try to reschedule the appointment with out having to move her surgery. I told her to call me if there is any scheduling question, conflicts or if she didn't receive a blue book at her final review appointment.

## 2025-07-02 NOTE — TELEPHONE ENCOUNTER
Spoke to patient, I offered 8/7 for surgery and she said she spoke to Dr Keane about having surgery after 9/18 due to work, so I offered 9/25 and she accepted.  I let her know that I will be scheduling her pre-op appointments which are: virtual pre-op class, PAT, H&P and to meet with the doctor to sign consent.  That is any of these appointments are no showed or rescheduled it can push out her surgery date.  She understood.  I also let them know that I will be scheduling their follow up appointments after surgery.   That once everything is scheduled I will put all the information in a letter with dates, times, who they are going to see and if it is virtual or in person.  This letter will post to your my chart and patient said she was good with going onto [employer] to get her letter.  I will also call you once it is completed.  You will hear from me by end of day today.  she acknowledged ok and thank you

## 2025-08-26 ENCOUNTER — TELEPHONE (OUTPATIENT)
Age: 45
End: 2025-08-26

## 2025-08-26 ENCOUNTER — HOSPITAL ENCOUNTER (OUTPATIENT)
Age: 45
Discharge: HOME | End: 2025-08-29
Payer: COMMERCIAL

## 2025-08-26 ENCOUNTER — HOSPITAL ENCOUNTER (OUTPATIENT)
Facility: HOSPITAL | Age: 45
Discharge: HOME | End: 2025-08-29
Payer: COMMERCIAL

## 2025-08-26 VITALS
BODY MASS INDEX: 47.09 KG/M2 | HEIGHT: 66 IN | WEIGHT: 293 LBS | HEART RATE: 76 BPM | SYSTOLIC BLOOD PRESSURE: 114 MMHG | TEMPERATURE: 97.9 F | DIASTOLIC BLOOD PRESSURE: 78 MMHG

## 2025-08-26 LAB
ALBUMIN SERPL-MCNC: 2.9 G/DL (ref 3.5–5.2)
ALBUMIN/GLOB SERPL: 0.7 (ref 1.1–2.2)
ALP SERPL-CCNC: 56 U/L (ref 35–104)
ALT SERPL-CCNC: 11 U/L (ref 10–35)
ANION GAP SERPL CALC-SCNC: 9 MMOL/L (ref 2–14)
APPEARANCE UR: CLEAR
AST SERPL-CCNC: 14 U/L (ref 10–35)
BACTERIA URNS QL MICRO: ABNORMAL /HPF
BASOPHILS # BLD: 0.04 K/UL (ref 0–0.1)
BASOPHILS NFR BLD: 0.8 % (ref 0–1)
BILIRUB SERPL-MCNC: 0.4 MG/DL (ref 0–1.2)
BILIRUB UR QL: NEGATIVE
BUN SERPL-MCNC: 10 MG/DL (ref 6–20)
BUN/CREAT SERPL: 12 (ref 12–20)
CALCIUM SERPL-MCNC: 9.2 MG/DL (ref 8.6–10)
CHLORIDE SERPL-SCNC: 103 MMOL/L (ref 98–107)
CO2 SERPL-SCNC: 25 MMOL/L (ref 20–29)
COLOR UR: ABNORMAL
CREAT SERPL-MCNC: 0.82 MG/DL (ref 0.6–1)
DIFFERENTIAL METHOD BLD: ABNORMAL
EKG ATRIAL RATE: 70 BPM
EKG DIAGNOSIS: NORMAL
EKG P AXIS: 40 DEGREES
EKG P-R INTERVAL: 178 MS
EKG Q-T INTERVAL: 396 MS
EKG QRS DURATION: 94 MS
EKG QTC CALCULATION (BAZETT): 427 MS
EKG R AXIS: 22 DEGREES
EKG T AXIS: 54 DEGREES
EKG VENTRICULAR RATE: 70 BPM
EOSINOPHIL # BLD: 0.08 K/UL (ref 0–0.4)
EOSINOPHIL NFR BLD: 1.6 % (ref 0–7)
EPITH CASTS URNS QL MICRO: ABNORMAL /LPF
ERYTHROCYTE [DISTWIDTH] IN BLOOD BY AUTOMATED COUNT: 13.1 % (ref 11.5–14.5)
EST. AVERAGE GLUCOSE BLD GHB EST-MCNC: 92 MG/DL
GLOBULIN SER CALC-MCNC: 4 G/DL (ref 2–4)
GLUCOSE SERPL-MCNC: 74 MG/DL (ref 65–100)
GLUCOSE UR STRIP.AUTO-MCNC: NEGATIVE MG/DL
HBA1C MFR BLD: 4.9 % (ref 4–5.6)
HCT VFR BLD AUTO: 30.7 % (ref 35–47)
HGB BLD-MCNC: 9.8 G/DL (ref 11.5–16)
HGB UR QL STRIP: NEGATIVE
HYALINE CASTS URNS QL MICRO: ABNORMAL /LPF (ref 0–5)
IMM GRANULOCYTES # BLD AUTO: 0.01 K/UL (ref 0–0.04)
IMM GRANULOCYTES NFR BLD AUTO: 0.2 % (ref 0–0.5)
KETONES UR QL STRIP.AUTO: NEGATIVE MG/DL
LEUKOCYTE ESTERASE UR QL STRIP.AUTO: ABNORMAL
LYMPHOCYTES # BLD: 1.62 K/UL (ref 0.8–3.5)
LYMPHOCYTES NFR BLD: 31.8 % (ref 12–49)
MAGNESIUM SERPL-MCNC: 2 MG/DL (ref 1.6–2.6)
MCH RBC QN AUTO: 26.6 PG (ref 26–34)
MCHC RBC AUTO-ENTMCNC: 31.9 G/DL (ref 30–36.5)
MCV RBC AUTO: 83.2 FL (ref 80–99)
MONOCYTES # BLD: 0.46 K/UL (ref 0–1)
MONOCYTES NFR BLD: 9 % (ref 5–13)
NEUTS SEG # BLD: 2.89 K/UL (ref 1.8–8)
NEUTS SEG NFR BLD: 56.6 % (ref 32–75)
NITRITE UR QL STRIP.AUTO: POSITIVE
NRBC # BLD: 0 K/UL (ref 0–0.01)
NRBC BLD-RTO: 0 PER 100 WBC
PH UR STRIP: 5.5 (ref 5–8)
PLATELET # BLD AUTO: 377 K/UL (ref 150–400)
PMV BLD AUTO: 11 FL (ref 8.9–12.9)
POTASSIUM SERPL-SCNC: 4.6 MMOL/L (ref 3.5–5.1)
PROT SERPL-MCNC: 6.9 G/DL (ref 6.4–8.3)
PROT UR STRIP-MCNC: NEGATIVE MG/DL
RBC # BLD AUTO: 3.69 M/UL (ref 3.8–5.2)
RBC #/AREA URNS HPF: ABNORMAL /HPF (ref 0–5)
SODIUM SERPL-SCNC: 137 MMOL/L (ref 136–145)
SP GR UR REFRACTOMETRY: <1.005 (ref 1–1.03)
T4 FREE SERPL-MCNC: 1 NG/DL (ref 0.9–1.6)
TSH, 3RD GENERATION: 1.96 UIU/ML (ref 0.27–4.2)
URINE CULTURE IF INDICATED: ABNORMAL
UROBILINOGEN UR QL STRIP.AUTO: 0.2 EU/DL (ref 0.2–1)
WBC # BLD AUTO: 5.1 K/UL (ref 3.6–11)
WBC URNS QL MICRO: ABNORMAL /HPF (ref 0–4)

## 2025-08-26 PROCEDURE — 87088 URINE BACTERIA CULTURE: CPT

## 2025-08-26 PROCEDURE — 80053 COMPREHEN METABOLIC PANEL: CPT

## 2025-08-26 PROCEDURE — 84443 ASSAY THYROID STIM HORMONE: CPT

## 2025-08-26 PROCEDURE — 87086 URINE CULTURE/COLONY COUNT: CPT

## 2025-08-26 PROCEDURE — 81001 URINALYSIS AUTO W/SCOPE: CPT

## 2025-08-26 PROCEDURE — 85025 COMPLETE CBC W/AUTO DIFF WBC: CPT

## 2025-08-26 PROCEDURE — 83036 HEMOGLOBIN GLYCOSYLATED A1C: CPT

## 2025-08-26 PROCEDURE — 87186 SC STD MICRODIL/AGAR DIL: CPT

## 2025-08-26 PROCEDURE — 71046 X-RAY EXAM CHEST 2 VIEWS: CPT

## 2025-08-26 PROCEDURE — 83735 ASSAY OF MAGNESIUM: CPT

## 2025-08-26 PROCEDURE — 93005 ELECTROCARDIOGRAM TRACING: CPT | Performed by: SURGERY

## 2025-08-26 PROCEDURE — 84439 ASSAY OF FREE THYROXINE: CPT

## 2025-08-26 ASSESSMENT — PAIN SCALES - GENERAL: PAINLEVEL_OUTOF10: 0

## 2025-08-28 ENCOUNTER — TELEPHONE (OUTPATIENT)
Age: 45
End: 2025-08-28

## 2025-08-29 ENCOUNTER — CLINICAL DOCUMENTATION (OUTPATIENT)
Age: 45
End: 2025-08-29

## 2025-08-29 LAB
BACTERIA SPEC CULT: ABNORMAL
BACTERIA SPEC CULT: ABNORMAL
CC UR VC: ABNORMAL
SERVICE CMNT-IMP: ABNORMAL

## 2025-08-30 RX ORDER — SULFAMETHOXAZOLE AND TRIMETHOPRIM 800; 160 MG/1; MG/1
1 TABLET ORAL 2 TIMES DAILY
Qty: 10 TABLET | Refills: 0 | Status: SHIPPED | OUTPATIENT
Start: 2025-08-30 | End: 2025-09-04

## 2025-09-02 ENCOUNTER — TELEPHONE (OUTPATIENT)
Age: 45
End: 2025-09-02

## 2025-09-03 ENCOUNTER — TELEMEDICINE (OUTPATIENT)
Age: 45
End: 2025-09-03

## 2025-09-03 VITALS — BODY MASS INDEX: 47.09 KG/M2 | WEIGHT: 293 LBS | HEIGHT: 66 IN

## 2025-09-03 DIAGNOSIS — E78.00 HYPERCHOLESTEREMIA: ICD-10-CM

## 2025-09-03 DIAGNOSIS — E11.9 TYPE 2 DIABETES MELLITUS WITHOUT COMPLICATION, WITHOUT LONG-TERM CURRENT USE OF INSULIN (HCC): ICD-10-CM

## 2025-09-03 DIAGNOSIS — E66.01 MORBID OBESITY (HCC): Primary | ICD-10-CM

## 2025-09-03 DIAGNOSIS — G89.18 POST-OP PAIN: ICD-10-CM

## 2025-09-03 DIAGNOSIS — G47.33 OBSTRUCTIVE SLEEP APNEA SYNDROME: ICD-10-CM

## 2025-09-03 PROCEDURE — 99024 POSTOP FOLLOW-UP VISIT: CPT | Performed by: NURSE PRACTITIONER

## 2025-09-03 RX ORDER — POLYETHYLENE GLYCOL 3350 17 G/17G
17 POWDER, FOR SOLUTION ORAL DAILY
Qty: 1530 G | Refills: 0 | Status: SHIPPED | OUTPATIENT
Start: 2025-09-03 | End: 2025-12-02

## 2025-09-03 RX ORDER — GABAPENTIN 100 MG/1
100-200 CAPSULE ORAL 3 TIMES DAILY
Qty: 30 CAPSULE | Refills: 0 | Status: SHIPPED | OUTPATIENT
Start: 2025-09-03 | End: 2025-09-08

## 2025-09-03 RX ORDER — ONDANSETRON 4 MG/1
4 TABLET, FILM COATED ORAL EVERY 8 HOURS PRN
Qty: 30 TABLET | Refills: 0 | Status: SHIPPED | OUTPATIENT
Start: 2025-09-03

## 2025-09-03 RX ORDER — ENOXAPARIN SODIUM 100 MG/ML
40 INJECTION SUBCUTANEOUS DAILY
Qty: 14 EACH | Refills: 0 | Status: SHIPPED | OUTPATIENT
Start: 2025-09-03

## 2025-09-03 RX ORDER — OMEPRAZOLE 40 MG/1
40 CAPSULE, DELAYED RELEASE ORAL
Qty: 90 CAPSULE | Refills: 1 | Status: SHIPPED | OUTPATIENT
Start: 2025-09-03

## 2025-09-03 ASSESSMENT — PATIENT HEALTH QUESTIONNAIRE - PHQ9
SUM OF ALL RESPONSES TO PHQ QUESTIONS 1-9: 0
1. LITTLE INTEREST OR PLEASURE IN DOING THINGS: NOT AT ALL
2. FEELING DOWN, DEPRESSED OR HOPELESS: NOT AT ALL
SUM OF ALL RESPONSES TO PHQ QUESTIONS 1-9: 0

## 2025-09-03 ASSESSMENT — LIFESTYLE VARIABLES
HOW MANY STANDARD DRINKS CONTAINING ALCOHOL DO YOU HAVE ON A TYPICAL DAY: PATIENT DOES NOT DRINK
HOW OFTEN DO YOU HAVE A DRINK CONTAINING ALCOHOL: NEVER